# Patient Record
Sex: MALE | URBAN - METROPOLITAN AREA
[De-identification: names, ages, dates, MRNs, and addresses within clinical notes are randomized per-mention and may not be internally consistent; named-entity substitution may affect disease eponyms.]

---

## 2020-12-01 ENCOUNTER — APPOINTMENT (RX ONLY)
Dept: URBAN - METROPOLITAN AREA CLINIC 42 | Facility: CLINIC | Age: 45
Setting detail: DERMATOLOGY
End: 2020-12-01

## 2020-12-01 DIAGNOSIS — L30.9 DERMATITIS, UNSPECIFIED: ICD-10-CM

## 2020-12-01 PROCEDURE — 99202 OFFICE O/P NEW SF 15 MIN: CPT

## 2020-12-01 PROCEDURE — ? COUNSELING

## 2020-12-01 PROCEDURE — ? DIAGNOSIS COMMENT

## 2020-12-01 ASSESSMENT — LOCATION SIMPLE DESCRIPTION DERM
LOCATION SIMPLE: CORONA
LOCATION SIMPLE: GLUTEAL CLEFT

## 2020-12-01 ASSESSMENT — LOCATION ZONE DERM
LOCATION ZONE: TRUNK
LOCATION ZONE: PENIS

## 2020-12-01 ASSESSMENT — LOCATION DETAILED DESCRIPTION DERM
LOCATION DETAILED: DORSAL CORONA
LOCATION DETAILED: GLUTEAL CLEFT

## 2020-12-01 NOTE — PROCEDURE: DIAGNOSIS COMMENT
Comment: Start Tacrolimus daily Apply BID x 2 weeks, then as needed for flares.\\nSevere constipation over the last 6 months, pt seeing gastro this month.\\nRecommended flushable wipes.
Detail Level: Zone

## 2020-12-01 NOTE — PROCEDURE: MIPS QUALITY
Quality 130: Documentation Of Current Medications In The Medical Record: Current Medications Documented
Quality 110: Preventive Care And Screening: Influenza Immunization: Influenza Immunization Administered during Influenza season
Detail Level: Detailed
Additional Notes: Have you traveled internationally or on a cruise ship in the last 14 days? No \\nHave you traveled out of state within the [place] in the last 14 days? No\\nHave you been in contact with anyone who is suspected of having, being tested, or has tested positive for COVID? No\\nHave you been experiencing any fever or respiratory symptoms? No\\nHave you been experiencing any GI symptoms (diarrhea, vomiting, nausea, stomach pain) in the last week? No

## 2020-12-01 NOTE — HPI: OTHER
Condition:: burning sensation
Please Describe Your Condition:: Pt presents for the evaluation of a burning sensation in between the buttocks, which has been present since September. He notes he has had GI issues such as constipation and occasional diarrhea, and he is seeing gastro soon.

## 2021-02-05 ENCOUNTER — TELEHEALTH PROVIDED OTHER THAN IN PATIENT'S HOME (OUTPATIENT)
Dept: URBAN - METROPOLITAN AREA TELEHEALTH 3 | Facility: TELEHEALTH | Age: 46
End: 2021-02-05

## 2021-02-05 VITALS — WEIGHT: 190 LBS | HEIGHT: 67 IN

## 2021-02-05 DIAGNOSIS — K59.09 OTHER CONSTIPATION: ICD-10-CM

## 2021-02-05 DIAGNOSIS — R14.0 ABDOMINAL DISTENSION (GASEOUS): ICD-10-CM

## 2021-02-05 DIAGNOSIS — R10.2 PELVIC AND PERINEAL PAIN: ICD-10-CM

## 2021-02-05 PROCEDURE — 99203 OFFICE O/P NEW LOW 30 MIN: CPT | Mod: 95 | Performed by: INTERNAL MEDICINE

## 2021-02-05 NOTE — SERVICEHPINOTES
PATIENT VERIFIED BY DATE OF BIRTH AND NAME. Patient has been consented for this telecommunication visit. Pt presents for evaluation of his gastrointestinal complaints. Some degree of chronic issues, but worse when pandemic started. Main issues are gas, constipation, and perianal discomfort. Constipated, going 2-3 days without BM. Strains to pass stool, then associated perianal pain, burning, and itching. Worse during and after passing stool. No blood in stool or with wiping. Constipation improved with diet, exercise but some lingering perianal discomfort and burning. Saw dermatology, noted hyperpigmentation in that area. Topical cream recommended, which is helping. Diet is healthy, no changes to detergent/lotion/soap/medication. Associated gas, bloating, and regurgitation symptoms. BMs now more regular, now 1 BM/day soft to loose stools, but no blood in stools or with wiping no melena. Sometimes strains. Hasn't tried much else to help symptoms previously took metamucil, not sure of its effect. Nothing worsens symptoms. Recently started doxycycline for BPH. No prior colonoscopy or EGD. No family history of colon cancer. Labs 6/2020 are unremarkable (CMP, CBC UBT negative for H pylori).10-point ROS completed with patient, pertinent positives/negatives outlined above.